# Patient Record
(demographics unavailable — no encounter records)

---

## 2017-03-10 NOTE — ER DOCUMENT REPORT
ED Respiratory Problem





- General


Chief Complaint: Breathing Difficulty


Stated Complaint: DIFFICULTY BREATHING


Time seen by provider: 22:33


Mode of Arrival: Ambulatory


Information source: Patient


TRAVEL OUTSIDE OF THE [place] IN LAST 30 DAYS: No





- HPI


Patient complains to provider of: Asthma, Cough, Short of breath


Onset: Other - 3 days


Duration: Worse/persistent


Context: Hx asthma


Short of Breath: Moderate


Chest pain/discomfort: Tightness


Cough: Productive


Sputum amount: Small


Sputum color: Yellow


Sputum consistency: Mucoid


At home treatment: Bronchodilators


Associated symptoms: Congestion, Cough, Short of breath, Wheezing


Similar symptoms previously: Yes


Recently seen / treated by doctor: No


Notes: 


Patient is a 41-year-old female with a history of asthma who presents to the 

emergency room complaining of difficulty breathing that's been worsening over 

the past 3 days, she recently completed a course of steroids approximate 5 days 

ago, she reports increased pollen contact over the last few days, a cough 

productive of thick yellow greenish phlegm, denies fever, has been using her 

albuterol inhaler and nebulizer treatments at home with minimal intermittent 

relief, denies a fever, no chest pain





- Related Data


Allergies/Adverse Reactions: 


 





Sulfa (Sulfonamide Antibiotics) Allergy (Verified 01/09/16 06:23)


 











Past Medical History





- General


Information source: Patient





- Social History


Smoking Status: Never Smoker


Chew tobacco use (# tins/day): No


Frequency of alcohol use: None


Drug Abuse: None


Family History: Reviewed & Not Pertinent


Patient has suicidal ideation: No


Patient has homicidal ideation: No





- Past Medical History


Cardiac Medical History: 


   Denies: Hx Coronary Artery Disease, Hx Heart Attack, Hx Hypertension


Pulmonary Medical History: Reports: Hx Asthma - Hospitalized Marrch & April 2015

, Hx Pneumonia - 3-4 yrs ago


   Denies: Hx Bronchitis, Hx COPD, Hx Tuberculosis


Neurological Medical History: Denies: Hx Cerebrovascular Accident, Hx Seizures


Renal/ Medical History: Denies: Hx Peritoneal Dialysis


Musculoskeltal Medical History: Denies Hx Arthritis


Past Surgical History: Reports: Hx Breast Surgery - left breast lump removed, 

Hx Hysterectomy - partial, Hx Orthopedic Surgery - RIGHT ANKLE ORIF, Hx Tubal 

Ligation.  Denies: Hx Pacemaker





- Immunizations


Hx Diphtheria, Pertussis, Tetanus Vaccination: Yes


Hx Pneumococcal Vaccination: 03/30/15





Review of Systems





- Review of Systems


Constitutional: No symptoms reported


EENT: No symptoms reported


Cardiovascular: No symptoms reported


Respiratory: See HPI


Gastrointestinal: No symptoms reported


Genitourinary: No symptoms reported


Female Genitourinary: No symptoms reported


Musculoskeletal: No symptoms reported


Skin: No symptoms reported


Hematologic/Lymphatic: No symptoms reported


Neurological/Psychological: No symptoms reported


-: Yes All other systems reviewed and negative





Physical Exam





- Vital signs


Vitals: 


 











Temp Pulse Resp BP Pulse Ox


 


 98.3 F   131 H  28 H  142/88 H  93 


 


 03/10/17 19:53  03/10/17 19:53  03/10/17 19:53  03/10/17 19:53  03/10/17 19:53











Interpretation: Hypertensive, Tachycardic, Tachypneic





- General


General appearance: Appears well, Alert





- HEENT


Head: Normocephalic, Atraumatic


Eyes: Normal


Pupils: PERRL





- Respiratory


Respiratory status: No respiratory distress


Chest status: Nontender


Breath sounds: Decreased air movement, Nonproductive cough, Wheezing


Chest palpation: Normal





- Cardiovascular


Rhythm: Regular, Tachycardia


Heart sounds: Normal auscultation


Murmur: No





- Abdominal


Inspection: Normal


Distension: No distension


Bowel sounds: Normal


Tenderness: Nontender


Organomegaly: No organomegaly





- Back


Back: Normal, Nontender





- Extremities


General upper extremity: Normal inspection, Nontender, Normal color, Normal ROM

, Normal temperature


General lower extremity: Normal inspection, Nontender, Normal color, Normal ROM

, Normal temperature, Normal weight bearing.  No: Dante's sign





- Neurological


Neuro grossly intact: Yes


Cognition: Normal


Orientation: AAOx4


Barry Coma Scale Eye Opening: Spontaneous


Jean Pierre Coma Scale Verbal: Oriented


Jean Pierre Coma Scale Motor: Obeys Commands


Barry Coma Scale Total: 15


Speech: Normal


Motor strength normal: LUE, RUE, LLE, RLE


Sensory: Normal





- Psychological


Associated symptoms: Normal affect, Normal mood





- Skin


Skin Temperature: Warm


Skin Moisture: Dry


Skin Color: Normal





Course





- Re-evaluation


Re-evalutation: 


03/11/17 01:43


Patient reports feeling much better, and ready to go home, she still has mild 

wheezing on exam, but is able to talk in full sentences, and vital signs are 

stable, she will be discharged with a tapering dose pack of steroids, and 

instructions for follow-up, advised to return if symptoms worsen, patient 

acknowledges understanding and agreement with this plan








- Vital Signs


Vital signs: 


 











Temp Pulse Resp BP Pulse Ox


 


 98.3 F   131 H  28 H  142/88 H  93 


 


 03/10/17 19:53  03/10/17 19:53  03/10/17 19:53  03/10/17 19:53  03/10/17 19:53














- Diagnostic Test


Radiology reviewed: Image reviewed, Reports reviewed





Discharge





- Discharge


Clinical Impression: 


Acute asthma exacerbation


Qualifiers:


 Asthma severity: moderate persistent Qualified Code(s): J45.41 - Moderate 

persistent asthma with (acute) exacerbation





Condition: Stable


Disposition: HOME, SELF-CARE


Instructions:  Asthma ([facility])


Additional Instructions: 


Follow up with your primary care provider in one to 2 days.  Return to the 

emergency room immediately if symptoms worsen or any additional concerns.


Prescriptions: 


Prednisone 40 mg PO DAILY #25 tablet


Referrals: 


OFE ACEVEDO MD [Primary Care Provider] - Follow up as needed

## 2017-03-10 NOTE — ER DOCUMENT REPORT
ED Medical Screen (RME)





- General


Stated Complaint: DIFFICULTY BREATHING


Notes: 


She states for the last 3 days she has been having difficulty breathing.  Using 

her nebulizer at home with no relief.  Sees a pulmonologist in [place].  

States problems with asthma began about 6 years ago.  Has been hospitalized for 

this before.  Denies fever, congestion.  Denies chest pain but states chest 

feels tight.





I have greeted and performed a rapid initial assessment of this patient.  A 

comprehensive ED assessment and evaluation of the patient, analysis of test 

results and completion of the medical decision making process will be conducted 

by additional ED providers.


TRAVEL OUTSIDE OF THE [place] IN LAST 30 DAYS: No





- Related Data


Allergies/Adverse Reactions: 


 





Sulfa (Sulfonamide Antibiotics) Allergy (Verified 01/09/16 06:23)


 











Past Medical History





- Past Medical History


Cardiac Medical History: 


   Denies: Hx Coronary Artery Disease, Hx Heart Attack, Hx Hypertension


Pulmonary Medical History: Reports: Hx Asthma - Hospitalized Marr[facility] & April 2015

, Hx Pneumonia - 3-4 yrs ago


   Denies: Hx Bronchitis, Hx COPD, Hx Tuberculosis


Neurological Medical History: Denies: Hx Cerebrovascular Accident, Hx Seizures


Musculoskeltal Medical History: Denies Hx Arthritis


Past Surgical History: Reports: Hx Breast Surgery - left breast lump removed, 

Hx Hysterectomy - partial, Hx Orthopedic Surgery - RIGHT ANKLE ORIF, Hx Tubal 

Ligation.  Denies: Hx Pacemaker





- Immunizations


Hx Diphtheria, Pertussis, Tetanus Vaccination: Yes





Physical Exam





- Respiratory


Notes: 


Patient has audible wheezing in triage.  Speaks in full sentences, but noted to 

be taking short, shallow breaths.

## 2017-04-09 NOTE — ER DOCUMENT REPORT
ED General





- General


Stated Complaint: RESPIRATORY DISTRESS


Notes: 


A she is a 41-year-old female past medical history of asthma that has required 

multiple admissions in the past but no prior intubations who presents in 

respiratory distress.  Patient states that she began having progressive 

worsening of her wheezing and coughing today.  She was using home nebulizer 

treatments with minimal to no improvement of her symptoms.  This did prompt her 

to contact EMS.  States she's had similar exacerbations in the past that 

required hospitalization.  She was recently treated with a prednisone taper 

that she completed 2 weeks ago and states she had been doing well up until last 

2 days.  She denies any constitutional symptoms or fever.  No chest pain or 

pleuritic pain.  Nothing worsens or symptoms.  She has not seen her primary 

care doctor since her symptoms started getting worse.


TRAVEL OUTSIDE OF THE [place] IN LAST 30 DAYS: No





- Related Data


Allergies/Adverse Reactions: 


 





Sulfa (Sulfonamide Antibiotics) Allergy (Verified 01/09/16 06:23)


 











Past Medical History





- General


Information source: Patient





- Social History


Smoking Status: Never Smoker


Frequency of alcohol use: None


Drug Abuse: None


Lives with: Family


Family History: Reviewed & Not Pertinent





- Past Medical History


Cardiac Medical History: 


   Denies: Hx Coronary Artery Disease, Hx Heart Attack, Hx Hypertension


Pulmonary Medical History: Reports: Hx Asthma - Hospitalized [facility] & April 2015

, Hx Pneumonia - 3-4 yrs ago


   Denies: Hx Bronchitis, Hx COPD, Hx Tuberculosis


Neurological Medical History: Denies: Hx Cerebrovascular Accident, Hx Seizures


Renal/ Medical History: Denies: Hx Peritoneal Dialysis


Musculoskeltal Medical History: Denies Hx Arthritis


Past Surgical History: Reports: Hx Breast Surgery - left breast lump removed, 

Hx Hysterectomy - partial, Hx Orthopedic Surgery - RIGHT ANKLE ORIF, Hx Tubal 

Ligation.  Denies: Hx Pacemaker





- Immunizations


Hx Diphtheria, Pertussis, Tetanus Vaccination: Yes


Hx Pneumococcal Vaccination: 03/30/15





Review of Systems





- Review of Systems


Notes: 


Constitutional: Negative for fever.


HENT: Negative for sore throat.


Eyes: Negative for visual changes.


Cardiovascular: Negative for chest pain.


Respiratory: Positive for shortness of breath.


Gastrointestinal: Negative for abdominal pain, vomiting or diarrhea.


Genitourinary: Negative for dysuria.


Musculoskeletal: Negative for back pain.


Skin: Negative for rash.


Neurological: Negative for headaches, weakness or numbness.





10 point ROS negative except as marked above and in HPI.





Physical Exam





- Vital signs


Vitals: 


 











Temp Pulse Resp BP Pulse Ox


 


 98.8 F   120 H  28 H  134/80 H  95 


 


 04/09/17 20:10  04/09/17 20:10  04/09/17 20:10  04/09/17 20:10  04/09/17 20:10











Interpretation: Tachycardic, Tachypneic


Notes: 


PHYSICAL EXAMINATION:





GENERAL: Patient appears in moderate respiratory distress, uncomfortable in 

appearance.





HEAD: Atraumatic, normocephalic.





EYES: Pupils equal round and reactive to light, extraocular movements intact, 

sclera anicteric, conjunctiva are normal.





ENT: nares patent, oropharynx clear without exudates.  Moderately dry mucous 

membranes.





NECK: Normal range of motion, supple without lymphadenopathy





LUNGS: Tachypnea with initial respiratory rate of 34 at time of assessment.  

Diminished air movement in all lung fields.  Severe wheezing with prolonged 

expiratory phase in all lung fields.





HEART: Tachycardia without murmurs





ABDOMEN: Soft, nontender, normoactive bowel sounds.  No guarding, no rebound.  

No masses appreciated.





EXTREMITIES: Normal range of motion, no pitting or edema.  No cyanosis.





NEUROLOGICAL: No focal neurological deficits. Moves all extremities 

spontaneously and on command.





PSYCH: Normal mood, normal affect.





SKIN: Warm, Dry, normal turgor, no rashes or lesions noted.





Course





- Re-evaluation


Re-evalutation: 


04/09/17 20:17


Patient presents in moderate respiratory distress, with super clavicular costal 

retractions.  She is tachypneic.  She has already received 125 mg of Solu-

Medrol prior to arrival.  She will be started on 2 g of IV magnesium with rapid 

infusion over 20 minutes.  IV fluids will be initiated.  She will be started on 

continuous albuterol nebulizers at this time.  Cardiac monitor will be placed.  

She will require frequent reassessments given her distress.





2045-patient continues with moderate respiratory distress although improved 

relative to arrival.  She remains slightly tachypneic with wheezing in all lung 

fields.  Will continue to monitor





04/09/17 21:57


On reassessment, patient is dramatically improved no longer having any 

respiratory distress.  She has only a scant wheeze in the bilateral lung fields 

on expiratory phase at this time.  Once she completes her continuous nebulizer, 

will plan for monitoring for at least one hour off continuous nebulizer prior 

to consideration of discharge





04/09/17 23:22


Patient's work of breathing is now normalized although her oxygen saturation is 

at 92% on room air.  I have discussed at length with the patient that given her 

oxygen saturation dips as low as 89% despite our interventions she should be 

admitted to the hospital as she is very high risk for decompensation if she is 

to be discharged.  Patient states that she's been admitted so many times in the 

past and so tired of being in hospitals that she really does not want to be 

admitted.  She verbalizes an understanding that if she goes home, she could get 

significantly sicker and even die.  Patient does have capacity to make this 

decision.  She has sound reasoning and logic.  She is not intoxicated.  

Multiple her family members at the bedside are witnesses conversation including 

her daughters.  She states that she plans to follow-up in her pulmonologists 

office immediately in the morning and will immediately return to the emergency 

department if she begins to get sicker.





- Vital Signs


Vital signs: 


 











Temp Pulse Resp BP Pulse Ox


 


 98.8 F   120 H  28 H  105/83   90 L


 


 04/09/17 20:10  04/09/17 20:10  04/09/17 20:10  04/09/17 23:01  04/09/17 23:01














- Diagnostic Test


Radiology reviewed: Image reviewed, Reports reviewed


Radiology results interpreted by me: 





04/10/17 03:10


Chest x-ray: No acute infiltrate





Critical Care Note





- Critical Care Note


Total time excluding time spent on procedures (mins): 40


Comments: 


Critical care time spent obtaining history from patient or surrogate, 

discussions with consultants, development of treatment plan with patient or 

surrogate, evaluation of patient's response to treatment, examination of patient

, ordering and performing treatments and interventions, ordering and review of 

laboratory studies, re-evaluation of patient's condition, ordering and review 

of radiographic studies and review of old charts





Discharge





- Discharge


Clinical Impression: 


 Asthma exacerbation, Respiratory distress





Condition: Serious


Disposition: HOME, SELF-CARE


Additional Instructions: 


You were seen for an asthma exacerbation.  We recommended you be admitted to 

the hospital but you have elected to go home and follow-up with your 

pulmonologist in the morning.  It is very important that you return to the 

emergency department immediately if you began to have worsening difficulty 

breathing that does not respond to your normal home nebulizers.  You are also 

being sent home on a five-day course of steroids that you should start taking 

tomorrow.  Please also follow closely with your primary care physician.  you 

should also return to emergency department if you develop fever greater than 101

, persistent cough, persistent vomiting, pass out, or any other symptoms that 

are concerning to you.


Prescriptions: 


Prednisone [Deltasone 20 mg Tablet] 3 tab PO DAILY 5 Days


Referrals: 


OFE ACEVEDO MD [Primary Care Provider] - Follow up as needed

## 2017-08-05 NOTE — RADIOLOGY REPORT (SQ)
EXAM DESCRIPTION:  C SP 4 OR 5 VIEWS



COMPLETED DATE/TIME:  8/5/2017 11:25 am



REASON FOR STUDY:  CERVICALGIA



COMPARISON:  None.



NUMBER OF VIEWS:  Five views including obliques.



TECHNIQUE:  AP, lateral, obliques and odontoid radiographic images acquired of the cervical spine.



LIMITATIONS:  None.



FINDINGS:  MINERALIZATION: Normal.

SEGMENTATION: Normal.

ALIGNMENT: Normal.

VERTEBRAE: Maintained height.  No fracture or worrisome bone lesion.

DISCS: Multilevel disc space narrowing with osteophytes.

POSTERIOR ELEMENTS: Pedicles and facets are intact.  No posterior arch defects.

Facet arthropathy is present.

FORAMINA: Narrowed at the levels of maximal disc and facet disease.

HARDWARE: None in the spine.

PARASPINAL SOFT TISSUES: Normal.

OTHER: No other significant finding.



IMPRESSION:  SPONDYLOSIS WITHOUT BONE LESION OR FRACTURE.



TECHNICAL DOCUMENTATION:  JOB ID:  [id number]

 2011 [employer]- All Rights Reserved

## 2018-02-14 NOTE — ER DOCUMENT REPORT
HPI





- HPI


Pain Level: Denies


Context: 





Patient is a 42-year-old female presents emergency department with a chief 

complaint of feeling tired, nonproductive cough, nausea over the past couple of 

days.  Patient states that she was sent here by her employer to be checked out.

  Patient does admit to a history of asthma that she follows with Dr. Guthrie 

for and is on chronic prednisone.  She states she occasionally coughs up some 

sputum but denies any tan colored sputum, fever, dyspnea on exertion or 

shortness of breath.  She denies any fevers or chills.  Previous hysterectomy





- REPRODUCTIVE


LMP: hyst


Reproductive: DENIES: Pregnant:





Past Medical History





- Social History


Smoking Status: Never Smoker


Chew tobacco use (# tins/day): No


Frequency of alcohol use: None


Drug Abuse: None


Family History: Reviewed & Not Pertinent


Patient has suicidal ideation: No


Patient has homicidal ideation: No





- Past Medical History


Cardiac Medical History: 


   Denies: Hx Coronary Artery Disease, Hx Heart Attack, Hx Hypertension


Pulmonary Medical History: Reports: Hx Asthma - Hospitalized Marr[facility] & April 2015

, Hx Pneumonia - 3-4 yrs ago


   Denies: Hx Bronchitis, Hx COPD, Hx Tuberculosis


Neurological Medical History: Denies: Hx Cerebrovascular Accident, Hx Seizures


Renal/ Medical History: Denies: Hx Peritoneal Dialysis


Musculoskeltal Medical History: Denies Hx Arthritis


Past Surgical History: Reports: Hx Breast Surgery - left breast lump removed, 

Hx Hysterectomy, Hx Orthopedic Surgery - RIGHT ANKLE ORIF, Hx Tubal Ligation.  

Denies: Hx Pacemaker





- Immunizations


Hx Diphtheria, Pertussis, Tetanus Vaccination: Yes


Hx Pneumococcal Vaccination: 03/30/15





[employer] Provider Document





- CONSTITUTIONAL


Agree With Documented VS: Yes


Notes: 





PHYSICAL EXAM


GENERAL: Alert, interacts well. 


HEAD: Normocephalic, atraumatic.


EYES: Pupils equal, round, and reactive to light. Extraocular movements intact.


ENT: Oral mucosa moist, tongue midline. 


NECK: Full range of motion. Supple. Trachea midline.


LUNGS: MIld wheezes bilaterally worse in MARTIN, rales, or rhonchi. No respiratory 

distress.


HEART: Regular rate and rhythm. No murmurs, gallops, or rubs.


ABDOMEN: Soft,  nondistended, nontender. No guarding, rebound, or rigidity.. 

Bowel sounds present in all 4 quadrants.


EXTREMITIES: Moves all 4 extremities spontaneously. No edema, radial and 

dorsalis pedis pulses 2/4 bilaterally. No cyanosis. 


NEUROLOGICAL: Alert and oriented x4. Normal speech.


PSYCH: Normal affect, normal mood.


SKIN: Warm, dry, normal turgor. No rashes or lesions noted.








- INFECTION CONTROL


TRAVEL OUTSIDE OF THE [place] IN LAST 30 DAYS: No





- RESPIRATORY


O2 Sat by Pulse Oximetry: 95





Course





- Re-evaluation


Re-evalutation: 





02/14/18 16:46


Patient is a 42-year-old female presents emergency department the chief 

complaint of nausea, nonproductive cough.  Patient's vitals are stable and able 

to ambulate and maintain the saturation 9690% on room air.  Patient states she 

clinically feels better after breathing treatment.  Chest x-ray does not show 

any evidence of an infiltrate.  Will discharge patient home with instruction to 

follow-up with her pulmonologist Dr. Guthrie tomorrow otherwise will discharge 

home with rescue inhaler and Zofran.  Discussed strict return precautions 

otherwise patient is stable for discharge home.





- Vital Signs


Vital signs: 


 











Temp Pulse Resp BP Pulse Ox


 


 98.6 F   108 H  18   131/82 H  95 


 


 02/14/18 14:21  02/14/18 14:21  02/14/18 14:21  02/14/18 14:21  02/14/18 14:21














- Diagnostic Test


Radiology reviewed: Image reviewed, Reports reviewed





Discharge





- Discharge


Clinical Impression: 


 Nausea





Asthma exacerbation


Qualifiers:


 Asthma severity: unspecified severity Asthma persistence: unspecified 

Qualified Code(s): J45.901 - Unspecified asthma with (acute) exacerbation





Condition: Good


Disposition: HOME, SELF-CARE


Instructions:  Asthma ([facility])


Prescriptions: 


Albuterol Sulfate [Proair HFA] 1 - 2 puff IH Q4 PRN #1 inhaler


 PRN Reason: 


Ondansetron [Zofran Odt 4 mg Tablet] 1 - 2 tab PO Q4H PRN #15 tab.rapdis


 PRN Reason: For Nausea/Vomiting


Forms:  Return to Work


Referrals: 


GADIEL GUTHRIE MD [ACTIVE STAFF] - Follow up tomorrow

## 2018-02-14 NOTE — RADIOLOGY REPORT (SQ)
Exam Description

CHEST PA/LAT



Completed Date/time

02/14/2018  4:03 PM



Reason For Study

h/o asthma, sweats and prod cough



Comparison

3/10/2017.



Exam Parameters

NUMBER OF VIEWS: Two views.

TECHNIQUE: Digital Frontal and Lateral radiographic views of the chest acquired.

Limitations: (None).



Findings

LUNGS AND PLEURA: No opacities, masses or pneumothorax No pleural effusion.

MEDIASTINUM AND HILAR STRUCTURES: No masses or contour abnormalities.

HEART AND VASCULAR STRUCTURES: Heart size is normal. No evidence for failure.

(Normal appearing aorta for age.) .

BONES: No acute findings.

HARDWARE: None.



Impression

NO SIGNIFICANT RADIOGRAPHIC FINDING IN THE CHEST.







Technical Documentation

2011 [employer]- All Rights Reserved

## 2018-02-22 NOTE — ER DOCUMENT REPORT
ED Respiratory Problem





- General


Mode of Arrival: Ambulatory


Information source: Patient


TRAVEL OUTSIDE OF THE [place] IN LAST 30 DAYS: No





- HPI


Patient complains to provider of: Asthma, Cough, Short of breath


Onset: This morning


Context: Hx asthma.  denies: Smoker


Cough: Productive


At home treatment: Bronchodilators - Albuterol


Associated symptoms: Other - see notes above





- General


Chief Complaint: Wheezing >1yr age


Stated Complaint: BREATHING PROBLEMS


Time Seen by Provider: 02/22/18 09:11


Notes: 





42 year old female with history of asthma (daily Prednisone x4 years; recently 

started tapering medication now on one 5mg daily) presents to the ED 

complaining of shortness of breath and wheezing that started this morning. 

Patient took an Albuterol treatment at 0600 with minimal relief. Patient called 

her pulmonologist, Dr. Guthrie, but was unable to get an appointment today. 

When the patient continued to wheeze she decided to come to the ED. Patient 

additionally complains of a productive cough. Patient has not been intubated 

secondary to her asthma exacerbations.  (DIONICIO ACEVEDO)





- Related Data


Allergies/Adverse Reactions: 


 





Sulfa (Sulfonamide Antibiotics) Allergy (Verified 02/22/18 08:48)


 











Past Medical History





- General


Information source: Patient





- Social History


Smoking Status: Never Smoker


Chew tobacco use (# tins/day): No


Frequency of alcohol use: None


Drug Abuse: None


Family History: Reviewed & Not Pertinent





- Past Medical History


Cardiac Medical History: 


   Denies: Hx Coronary Artery Disease, Hx Heart Attack, Hx Hypertension


Pulmonary Medical History: Reports: Hx Asthma - Hospitalized March & April 2015

, Hx Pneumonia - 3-4 yrs ago


   Denies: Hx Bronchitis, Hx COPD, Hx Tuberculosis


Neurological Medical History: Denies: Hx Cerebrovascular Accident, Hx Seizures


Renal/ Medical History: Denies: Hx Peritoneal Dialysis


Musculoskeltal Medical History: Denies Hx Arthritis


Past Surgical History: Reports: Hx Breast Surgery - left breast lump removed, 

Hx Hysterectomy, Hx Orthopedic Surgery - RIGHT ANKLE ORIF, Hx Tubal Ligation.  

Denies: Hx Pacemaker





- Immunizations


Hx Diphtheria, Pertussis, Tetanus Vaccination: Yes


Hx Pneumococcal Vaccination: 03/30/15





Review of Systems





- Review of Systems


Constitutional: No symptoms reported


EENT: No symptoms reported


Cardiovascular: No symptoms reported


Respiratory: See HPI, Cough, Short of breath, Sputum, Wheezing


Gastrointestinal: No symptoms reported


Genitourinary: No symptoms reported


Female Genitourinary: No symptoms reported


Musculoskeletal: No symptoms reported


Skin: No symptoms reported


Hematologic/Lymphatic: No symptoms reported


Neurological/Psychological: No symptoms reported


-: Yes All other systems reviewed and negative





Physical Exam





- Vital signs


Vitals: 


 











Temp Pulse Resp BP Pulse Ox


 


 98.0 F   125 H  20   141/82 H  94 


 


 02/22/18 08:49  02/22/18 08:49  02/22/18 08:49  02/22/18 08:49  02/22/18 08:49














- Notes


Notes: 





GENERAL: Alert, interacts well. No acute distress.


HEAD: Normocephalic, atraumatic.


EYES: Pupils equal, round, and reactive to light. Extraocular movements intact.


ENT: Oral mucosa moist, tongue midline. 


NECK: Full range of motion. Supple. Trachea midline.


LUNGS: Diffuse expiratory wheezing. Good air movement. No rales, or rhonchi. No 

respiratory distress. No tachypnea or accessory muscle use.


HEART: Regular rate and rhythm. No murmurs, gallops, or rubs.


ABDOMEN: Soft, non-tender. Non-distended. Bowel sounds present in all 4 

quadrants.


EXTREMITIES: Moves all 4 extremities spontaneously. No edema, radial pulses 2/4 

bilaterally. No cyanosis.


NEUROLOGICAL: Alert and oriented x3. Normal speech. 


PSYCH: Normal affect, normal mood.


SKIN: Warm, dry, normal turgor. No rashes or lesions noted. (DIONICIO ACEVEDO)





Course





- Re-evaluation


Re-evalutation: 





02/22/18 10:48


Initially patient has a moderate amount of wheezing although she is in no 

obvious respiratory distress, no significant hypoxia.  Patient has recently 

been tapered from 10 mg a day of prednisone to 5 mg a day, patient will be 

increased to 7.5 mg and trialed on multiple breathing treatments here.  After 3 

back-to-back breathing treatments patient has some decrease in her wheezing and 

feels better however she still has significant wheezing.  One more 5 mg 

albuterol breathing treatment will be attempted.


02/22/18 12:24


Wheezing is improved although not completely resolved with a fourth breathing 

treatment.  Patient is feeling much better.  Patient is agreeable to going home 

at this time, we will slightly increase her prednisone to 7.5 mg per day.  

Patient will be discharged to home, will take albuterol breathing treatments 

every 4 hours for the next 2 days, will return if she worsens or does not 

improve.


02/22/18 12:29





02/22/18 12:37


Discharge vitals revealed hypoxia at 90-91%.  Obviously with this oxygen 

saturation the patient cannot be discharged home, patient will be trialed on 

one more breathing treatment and if hypoxia does not resolve patient will be 

admitted instead.


02/22/18 14:16


After repeat breathing treatment patient's oxygen saturations are consistently 

approximately 97% on room air, he was ambulated and did not desaturate below 94%

.  Patient is in no acute distress, will be discharged home at this time. (

JUS GOTTLIEB)





- Vital Signs


Vital signs: 


 











Temp Pulse Resp BP Pulse Ox


 


 98.7 F   112 H  18   113/69   95 


 


 02/22/18 14:05  02/22/18 14:05  02/22/18 14:05  02/22/18 14:05  02/22/18 14:05














Discharge





- Discharge


Clinical Impression: 


Acute asthma exacerbation


Qualifiers:


 Asthma severity: moderate Asthma persistence: persistent Qualified Code(s): 

J45.41 - Moderate persistent asthma with (acute) exacerbation





Hypertension


Qualifiers:


 Hypertension type: essential hypertension Qualified Code(s): I10 - Essential (

primary) hypertension





Condition: Stable


Disposition: HOME, SELF-CARE


Additional Instructions: 


Please take your albuterol breathing treatments 1 treatment every 4 hours while 

awake for the next 2 days.  You may then decrease as needed back to regular 

schedule.





Please increase your prednisone to 7.5 mg once a day for the next week then 

returned to 5 mg a day and return to regular weaning schedule.


Prescriptions: 


Albuterol Sulfate [Albuterol Sulfate 2.5mg/3 mL] 2.5 mg IH Q4 #30 unit


Prednisone 2.5 mg PO DAILY #7 tablet


Forms:  Elevated Blood Pressure, Return to Work


Referrals: 


OFE ACEVEDO MD [Primary Care Provider] - Follow up in 1 week


Scribe Attestation: 





02/22/18 14:50


I personally performed the services described in the documentation, reviewed 

and edited the documentation which was dictated to the scribe in my presence, 

and it accurately records my words and actions. (JUS GOTTLIEB)





Scribe Documentation





- Scribe


Written by Tristianibe:: Jonatan Oconnell, 02/22/2018 0947


acting as scribe for :: Bebo

## 2018-03-04 NOTE — RADIOLOGY REPORT (SQ)
EXAM DESCRIPTION:  CHEST PA/LAT



COMPLETED DATE/TIME:  3/4/2018 2:41 pm



REASON FOR STUDY:  Asthma, cough with thick sputum



COMPARISON:  2/14/2018.



EXAM PARAMETERS:  NUMBER OF VIEWS: two views

TECHNIQUE: Digital Frontal and Lateral radiographic views of the chest acquired.

RADIATION DOSE: NA

LIMITATIONS: none



FINDINGS:  LUNGS AND PLEURA: No opacities, masses or pneumothorax. No pleural effusion.

MEDIASTINUM AND HILAR STRUCTURES: No masses or contour abnormalities.

HEART AND VASCULAR STRUCTURES: Heart normal size.  No evidence for failure.

BONES: No acute findings.

HARDWARE: None in the chest.

OTHER: No other significant finding.



IMPRESSION:  NO SIGNIFICANT RADIOGRAPHIC FINDING IN THE CHEST.



TECHNICAL DOCUMENTATION:  JOB ID:  [id number]

 2011 [employer]- All Rights Reserved



Reading location - IP/workstation name: [email or web address]

## 2018-03-04 NOTE — ER DOCUMENT REPORT
ED Medical Screen (RME)





- General


Chief Complaint: Shortness Of Breath


Stated Complaint: WHEEZING


Time Seen by Provider: 03/04/18 13:55


Notes: 





Patient is here because she is having difficulty breathing and wheezing.  She 

has a history of asthma for which she has home nebulizer and has been using 

albuterol every 4 hours and Pulmicort twice a day.  She was on prednisone, but 

only taking 2.5 mg pills and ran out of them.  Has a cough productive of thick 

yellow sputum.





TRAVEL OUTSIDE OF THE [place] IN LAST 30 DAYS: No





- Related Data


Allergies/Adverse Reactions: 


 





Sulfa (Sulfonamide Antibiotics) Allergy (Verified 02/22/18 08:48)


 











Past Medical History





- Social History


Cigarette use (# per day): No


Chew tobacco use (# tins/day): No


Family history: Reviewed & Not Pertinent


Pulmonary Medical History: Reports: Hx Asthma - Hospitalized March & April 2015

, Hx Pneumonia - 3-4 yrs ago


Past Surgical History: Reports: Hx Breast Surgery - left breast lump removed, 

Hx Genitourinary Surgery - Bladder tuck, Hx Hysterectomy, Hx Orthopedic Surgery 

- RIGHT ANKLE ORIF, Hx Tubal Ligation.  Denies: Hx Pacemaker





- Immunizations


Hx Diphtheria, Pertussis, Tetanus Vaccination: Yes





Review of Systems





- Review of Systems


Notes: 





REVIEW OF SYSTEMS:





CONSTITUTIONAL :  Denies fever.


  


EENT:   Denies eye, ear, nose or mouth or throat pain or other symptoms.





CARDIOVASCULAR:  Denies chest pain.





RESPIRATORY: See HPI.  





GASTROINTESTINAL:  Denies abdominal pain or nausea, vomiting, or diarrhea.





GENITOURINARY:  Denies difficulty or painful urinating, urinary frequency, 

blood in urine.





MUSCULOSKELETAL:  Denies back or neck pain.  Denies joint pain or swelling.





SKIN:   Denies rash or skin lesions.





NEUROLOGICAL:  Denies LOC or altered mental status.  Denies headache.  Denies 

sensory loss or motor deficits.





ALL OTHER SYSTEMS REVIEWED AND NEGATIVE.





Physical Exam





- Vital signs


Vitals: 


 











Temp Pulse BP Pulse Ox


 


 98.5 F   122 H  122/74   92 


 


 03/04/18 13:47  03/04/18 13:47  03/04/18 13:47  03/04/18 13:47











Interpretation: Tachycardic - Slightly at 122


Notes: 





PHYSICAL EXAMINATION:





GENERAL: Well-appearing, in no acute distress.  Slightly tachycardic vital 

signs.





HEAD: Atraumatic, normocephalic.





EYES: Pupils equal round and reactive to light, extraocular movements intact.





ENT: oropharynx clear without exudates.  Moist mucous membranes.





NECK: Normal range of motion, supple.





LUNGS: Diffuse expiratory wheezes bilaterally.  Moderate airflow.





HEART: Regular rate and rhythm without murmurs.  At patient's side, I took 

vital signs and heart rate is 100.





ABDOMEN: Soft, nontender.  No guarding or rebound.  No masses.





BACK:  No tenderness throughout entire back.





EXTREMITIES: Normal range of motion without pain.





NEUROLOGICAL:  Normal speech, normal gait.  Normal sensory, motor, and reflex 

exams.  Awake, alert, and oriented x3.  Cranial nerves normal.





PSYCH: Normal mood, normal affect.





SKIN: Warm, dry, no rashes.





Course





- Re-evaluation


Re-evalutation: 





03/04/18 16:29


Patient received 3 nebulizer treatments, the first of the DuoNeb and the second 

and third of albuterol.  She was given 80 mg of prednisone p.o. at the 

beginning of her treatment in the emergency department.  Obtained a chest x-ray 

that shows no acute findings.  Patient gradually improved with much improved 

airflow at the end of her stay.  She still had diffuse expiratory wheezes, but 

nowhere near as tight as when she arrived and was initially examined.  Her 

oxygen saturation is between 93 and 95% on room air.  Heart rate is still 100 

by me at discharge.  Patient's being provided with a prescription for a 

declining dose of prednisone starting at 60 mg tomorrow and 10 mg less each day 

thereafter until finished.  She is to continue her current home treatments of 

the nebulizer medications.











- Vital Signs


Vital signs: 


 











Temp Pulse Resp BP Pulse Ox


 


 98.7 F   108 H  16   133/87 H  94 


 


 03/04/18 16:18  03/04/18 16:18  03/04/18 16:18  03/04/18 16:18  03/04/18 16:18














- Diagnostic Test


Radiology results interpreted by me: 





03/04/18 16:29


Chest x-ray is normal.





Doctor's Discharge





- Discharge


Clinical Impression: 


 Asthma exacerbation





Condition: Stable


Disposition: HOME, SELF-CARE


Additional Instructions: 


ASTHMA:





     You have been diagnosed as having asthma.  This is a condition where there 

is episodic tightness in the bronchial tubes.  Allergies, infections, and 

polluted or cold air may be contributing factors.


     Emergency treatment of a severe asthma attack may include adrenaline shots

, or bronchodilator aerosol.  You may feel lightheaded, have a decreased 

exercise tolerance and a rapid pulse for an hour or two.  Rest and get plenty 

of fluids.


     Home treatment of asthma requires bronchodilator drugs.  These can be 

administered by injection, inhalation, or by mouth.  Antibiotics and 

corticosteroids may be required for some patients.  You should avoid chemical 

fumes, dusts, pollens, and exercising in very cold or dry air. If you smoke, 

stop!!


     If you develop a fever, increased wheezing, chest pain, or severe 

shortness of breath, you should contact the doctor immediately.








STEROID MEDICATION:


     You have been given an injection of or oral medicine of the cortisone/

steroid class.  This medication is used to control inflammation or allergy.  

Enio t is usually only given for a short period of time, until the acute process 

subsides.


     There are usually no side effects from short-term use of cortisone-like 

medications.  Some persons feel an increased sense of well-being and are not 

sleepy at bedtime.  Long-term use of cortisone medications is best avoided, 

unless required for a severe condition.  If your condition does not remit, or 

relapses after the course of corticosteroid medication, you should consult your 

physician.








INHALED BRONCHODILATORS:


     You have received treatment(s) of and/or prescription for an inhaled 

bronchodilator -- a medication which stimulates the airways in the lung to 

dilate.  This improves the flow of air in asthma, bronchitis, and emphysema.


     These medicines have some similarity to adrenaline, and can cause similar 

side effects:  shakiness, racing heart, and a sense of nervousness.  These side 

effects decrease with time.  Contact your doctor if these side effects are 

severe.


     Do not over-use the medicine.  Too-frequent use of the inhaler may make it 

ineffective.  Call your doctor if the inhaler is not controlling your symptoms 

at the prescribed doses.





USE OF ACETAMINOPHEN:


     Acetaminophen may be taken for pain relief or fever control. It's much 

safer than aspirin, offering a wider range of "safe" dosages.  It is safe 

during pregnancy.  Some brand names are Tylenol, Panadol, Datril, Anacin 3, 

Tempra, and Liquiprin. Acetaminophen can be repeated every four hours.  The 

following are maximum recommended dosages:








USE OF ACETAMINOPHEN (Tylenol):


     Acetaminophen may be taken for pain relief or fever control. It's much 

safer than aspirin, offering a wider range of "safe" dosages.  It is safe 

during pregnancy.  Some brand names are Tylenol, Panadol, Datril, Anacin 3, 

Tempra, and Liquiprin. Acetaminophen can be repeated every four hours.  The 

following are maximum recommended dosages:





WEIGHT         Dose             Drops                  Elixir        Chewable(

80mg)


(LBS.)                            drprs=droppers    tsp=teaspoon


>89 pounds or adults          650 mg to 900 mg





Acetaminophen can be repeated every four hours.  Maximum dose not to exceed 

4000 mg a day.





   These maximum recommended dosages are slightly higher than the dosages 

written on the product container, but these dosages are very safe and below the 

toxic dosage for acetaminophen.





Continue to use your nebulizer with albuterol every 4 hours and continue using 

your nebulizer with Pulmicort twice a day.  Start the prednisone that has been 

prescribed for you tomorrow.





FOLLOW-UP CARE:


If you have been referred to a physician for follow-up care, call the physician

s office for an appointment as you were instructed or within the next two days.

  If you experience worsening or a significant change in your symptoms, notify 

the physician immediately or return to the Emergency Department at any time for 

re-evaluation.


Prescriptions: 


Prednisone [Deltasone 10 mg Tablet] 10 mg PO ASDIR PRN #21 tablet


 PRN Reason: 


Referrals: 


OFE ACEVEDO MD [Primary Care Provider] - Follow up as needed

## 2018-03-21 NOTE — ER DOCUMENT REPORT
ED Respiratory Problem





- General


Chief Complaint: Shortness Of Breath


Stated Complaint: DIFFICULTY BREATHING


Time Seen by Provider: 03/21/18 14:36


Mode of Arrival: Ambulatory


Information source: Patient


Notes: 





43 yo female with severe persistant asthma, pt of dr guthrie flares when she is 

not on oral steroids. Uses symbicort, albuterol, antihistamin, not singulair. 

Wheezing again for a few days. No fever. No chest painl.


TRAVEL OUTSIDE OF THE [place] IN LAST 30 DAYS: No





- Related Data


Allergies/Adverse Reactions: 


 





Sulfa (Sulfonamide Antibiotics) Allergy (Verified 02/22/18 08:48)


 











Past Medical History





- General


Information source: Patient





- Social History


Smoking Status: Never Smoker


Frequency of alcohol use: Rare


Drug Abuse: None


Lives with: Family


Family History: Reviewed & Not Pertinent


Patient has suicidal ideation: No


Patient has homicidal ideation: No


Pulmonary Medical History: Reports: Hx Asthma - Hospitalized March & April 2015

, Hx Pneumonia - 3-4 yrs ago


Renal/ Medical History: Denies: Hx Peritoneal Dialysis


Past Surgical History: Reports: Hx Breast Surgery - left breast lump removed, 

Hx Genitourinary Surgery - Bladder tuck, Hx Hysterectomy, Hx Orthopedic Surgery 

- RIGHT ANKLE ORIF, Hx Tubal Ligation.  Denies: Hx Pacemaker





- Immunizations


Hx Diphtheria, Pertussis, Tetanus Vaccination: Yes


Hx Pneumococcal Vaccination: 03/30/15





Review of Systems





- Review of Systems


Constitutional: No symptoms reported


EENT: No symptoms reported


Cardiovascular: No symptoms reported


Respiratory: See HPI


Gastrointestinal: No symptoms reported


Genitourinary: No symptoms reported


Female Genitourinary: No symptoms reported


Musculoskeletal: No symptoms reported


Skin: No symptoms reported


Hematologic/Lymphatic: No symptoms reported


Neurological/Psychological: No symptoms reported





Physical Exam





- Vital signs


Vitals: 


 











Temp Pulse Resp BP Pulse Ox


 


 98.6 F   117 H  26 H  134/88 H  91 L


 


 03/21/18 14:09  03/21/18 14:09  03/21/18 14:09  03/21/18 14:09  03/21/18 14:09











Interpretation: Normal





- General


General appearance: Appears well, Alert





- HEENT


Head: Normocephalic, Atraumatic


Eyes: Normal


Conjunctiva: Normal


Pupils: PERRL


Tympanic membrane: Normal


Pharynx: Erythema - mild


Neck: Supple.  No: Lymphadenopathy





- Respiratory


Respiratory status: No respiratory distress


Chest status: Nontender


Breath sounds: Wheezing - expiratory bilaterally


Chest palpation: Normal





- Cardiovascular


Rhythm: Regular


Heart sounds: Normal auscultation


Murmur: No





- Abdominal


Inspection: Normal


Distension: No distension


Bowel sounds: Normal


Tenderness: Nontender


Organomegaly: No organomegaly





- Back


Back: Normal, Nontender





- Extremities


General upper extremity: Normal inspection, Nontender, Normal color, Normal ROM

, Normal temperature


General lower extremity: Normal inspection, Nontender, Normal color, Normal ROM

, Normal temperature, Normal weight bearing.  No: Dante's sign





- Neurological


Neuro grossly intact: Yes


Cognition: Normal


Orientation: AAOx4


[place] Coma Scale Eye Opening: Spontaneous


[place] Coma Scale Verbal: Oriented


[place] Coma Scale Motor: Obeys Commands


[place] Coma Scale Total: 15


Speech: Normal


Motor strength normal: LUE, RUE, LLE, RLE


Sensory: Normal





- Psychological


Associated symptoms: Normal affect, Normal mood





- Skin


Skin Temperature: Warm


Skin Moisture: Dry


Skin Color: Normal





Course





- Re-evaluation


Re-evalutation: 





03/21/18 19:12


Patient feels a lot better pulse ox is 94-95%, pulse is 117. RR 18


Faint expiratory wheeze bilateral but moving air much better.  IV fluid has 

infused.  She has had to grams of magnesium.  I will refill her albuterol 

Nebules and metered-dose inhaler.  I explained that she needs to return to the 

emergency room if her symptoms worsen again tonight and that she needs to see 

Dr. Guthrie this week


03/21/18 19:14





03/21/18 19:16





03/21/18 19:17





03/21/18 19:17








- Vital Signs


Vital signs: 


 











Temp Pulse Resp BP Pulse Ox


 


 99.4 F   92   24 H  123/74   93 


 


 03/21/18 20:11  03/21/18 20:11  03/21/18 20:11  03/21/18 20:11  03/21/18 20:11














- Laboratory


Result Diagrams: 


 03/21/18 17:30





 03/21/18 17:20


Laboratory results interpreted by me: 


 











  03/21/18 03/21/18





  17:20 17:30


 


WBC   16.0 H


 


RDW   14.2 H


 


Seg Neutrophils %   82.7 H


 


Lymphocytes %   8.7 L


 


Absolute Neutrophils   13.2 H


 


Absolute Eosinophils   0.7 H


 


Chloride  110 H 


 


Carbon Dioxide  21 L 


 


BUN  6 L 


 


Glucose  133 H 














Discharge





- Discharge


Clinical Impression: 


Asthma exacerbation


Qualifiers:


 Asthma severity: severe Asthma persistence: unspecified Qualified Code(s): 

J45.901 - Unspecified asthma with (acute) exacerbation





Condition: Good


Disposition: HOME, SELF-CARE


Instructions:  Asthma ([facility]), Inhaled Bronchodilators ([facility]), Steroid Medication


Additional Instructions: 


Call tomorrow and see Dr. Guthrie this week


Taper Dosepak of prednisone


Refill of albuterol Nebules and ProAir air metered-dose inhaler.  I told the 

patient to return to the emergency room if she gets worse again tonight.


Prescriptions: 


Albuterol Sulfate [Ventolin 0.083% Neb 2.5 mg/3 mL Ampul] 2.5 mg NEB Q3HP PRN #

25 vial


 PRN Reason: 


Albuterol Sulfate [Proair HFA Inhalation Aerosol 8.5 gm MDI] 2 puff IH Q3HP PRN 

#1 [id number].[email or web address]


 PRN Reason: 


Prednisone [Deltasone 10 mg Tablet] 10 mg PO ASDIR PRN #21 tablet


 PRN Reason: 


Referrals: 


OFE ACEVEDO MD [Primary Care Provider] - Follow up as needed


GADIEL GUTHRIE MD [ACTIVE STAFF] - Follow up tomorrow

## 2018-03-21 NOTE — ER DOCUMENT REPORT
ED Medical Screen (RME)





- General


Chief Complaint: Shortness Of Breath


Stated Complaint: DIFFICULTY BREATHING


Time Seen by Provider: 03/21/18 14:36


Notes: 





Patient states this is her third visit in the last month to month and half her 

wheezing.  She states whenever she is on prednisone she does fine but as soon 

as prednisone runs out her wheezing returns.  She states the inhalers at home 

were not helping her.  She states her oxygen saturations are usually around 94-

95%.  She denies any history of PEs or DVTs.  She states she has been diagnosed 

with asthma and chronic sinusitis.


TRAVEL OUTSIDE OF THE [place] IN LAST 30 DAYS: No





- Related Data


Allergies/Adverse Reactions: 


 





Sulfa (Sulfonamide Antibiotics) Allergy (Verified 02/22/18 08:48)


 











Past Medical History





- Social History


Frequency of alcohol use: Rare


Drug Abuse: None


Family history: Reviewed & Not Pertinent





- Past Medical History


Cardiac Medical History: 


   Denies: Hx Coronary Artery Disease, Hx Heart Attack, Hx Hypertension


Pulmonary Medical History: Reports: Hx Asthma - Hospitalized March & April 2015

, Hx Pneumonia - 3-4 yrs ago


   Denies: Hx Bronchitis, Hx COPD, Hx Tuberculosis


Neurological Medical History: Denies: Hx Cerebrovascular Accident, Hx Seizures


Renal/ Medical History: Denies: Hx Peritoneal Dialysis


Musculoskeltal Medical History: Denies Hx Arthritis


Past Surgical History: Reports: Hx Breast Surgery - left breast lump removed, 

Hx Genitourinary Surgery - Bladder tuck, Hx Hysterectomy, Hx Orthopedic Surgery 

- RIGHT ANKLE ORIF, Hx Tubal Ligation.  Denies: Hx Pacemaker





- Immunizations


Hx Diphtheria, Pertussis, Tetanus Vaccination: Yes





Physical Exam





- Vital signs


Vitals: 





 











Temp Pulse Resp BP Pulse Ox


 


 98.6 F   117 H  26 H  134/88 H  91 L


 


 03/21/18 14:09  03/21/18 14:09  03/21/18 14:09  03/21/18 14:09  03/21/18 14:09














Course





- Vital Signs


Vital signs: 





 











Temp Pulse Resp BP Pulse Ox


 


 98.6 F   117 H  26 H  134/88 H  91 L


 


 03/21/18 14:09  03/21/18 14:09  03/21/18 14:09  03/21/18 14:09  03/21/18 14:09

## 2018-03-21 NOTE — RADIOLOGY REPORT (SQ)
EXAM DESCRIPTION:  CHEST PA/LAT



COMPLETED DATE/TIME:  3/21/2018 4:47 pm



REASON FOR STUDY:  cough, wheeze



COMPARISON:  3/4/2018



EXAM PARAMETERS:  NUMBER OF VIEWS: two views

TECHNIQUE: Digital Frontal and Lateral radiographic views of the chest acquired.

RADIATION DOSE: NA

LIMITATIONS: none



FINDINGS:  LUNGS AND PLEURA: No opacities, masses or pneumothorax. No pleural effusion.

MEDIASTINUM AND HILAR STRUCTURES: No masses or contour abnormalities.

HEART AND VASCULAR STRUCTURES: Heart normal size.  No evidence for failure.

BONES: No acute findings.

HARDWARE: None in the chest.

OTHER: No other significant finding.



IMPRESSION:  NO SIGNIFICANT RADIOGRAPHIC FINDING IN THE CHEST.



TECHNICAL DOCUMENTATION:  JOB ID:  [id number]

 2011 [employer]- All Rights Reserved



Reading location - IP/workstation name: [email or web address]

## 2018-04-12 NOTE — WOMENS IMAGING REPORT
EXAM DESCRIPTION:  3D SCREENING MAMMO BILAT



COMPLETED DATE/TIME:  4/11/2018 4:07 pm



REASON FOR STUDY:  ROUTINE SCREENING;Z12.31 Z12.31  ENCNTR SCREEN MAMMOGRAM FOR MALIGNANT NEOPLASM OF
 ADAN



COMPARISON:  None.



TECHNIQUE:  Standard craniocaudal and mediolateral oblique views of each breast recorded using digita
l acquisition and breast tomosynthesis.



LIMITATIONS:  None.



FINDINGS:  RIGHT BREAST

MASSES: No suspicious masses.

CALCIFICATIONS: No new or suspicious calcifications.

ARCHITECTURAL DISTORTION: None.

DEVELOPING DENSITY: None.

ASYMMETRY: None noted.

OTHER: No other significant findings.

LEFT BREAST

MASSES: 1.4 cm lobular smooth mass inferior retroareolar

CALCIFICATIONS: No new or suspicious calcifications.

ARCHITECTURAL DISTORTION: None.

DEVELOPING DENSITY: None.

ASYMMETRY: None noted.

OTHER: No other significant findings.

Read with the assistance of CAD.

[facility] - R2 Cenova Version 1.3

.[facility] - R2 Cenova Version 1.3

.[facility] - R2 Cenova Version 2.4

.[facility] - R2 Cenova Version 2.4

.[facility] - R2  Version 9.2



IMPRESSION:  1.4 cm lobular smooth mass subareolar left breast.



BREAST DENSITY:  b. There are scattered areas of fibroglandular density.



BIRAD:  0 Incomplete:  Needs Additional Imaging Evaluation and/or prior Mammograms for Comparison.



RECOMMENDATION:  RECOMMENDED FOLLOW-UP: Ultrasound.

The patient will be contacted for additional imaging.



COMMENT:  The patient has been notified of the results by letter per SA requirements. Additional no
tification policies are in place for contacting patient with suspicious or incomplete findings.

Quality ID #225: The American College of Radiology recommends an annual screening mammogram for women
 aged 40 years or over. This facility utilizes a reminder system to ensure that all patients receive 
reminder letters, and/or direct phone calls for appointments. This includes reminders for routine scr
eening mammograms, diagnostic mammograms, or other Breast Imaging Interventions when appropriate.  Th
is patient will be placed in the appropriate reminder system.

The American College of Radiology (ACR) has developed recommendations for screening MRI of the breast
s in certain patient populations, to be used in conjunction with mammography.  Breast MRI surveillanc
e may be appropriate for women with more than 20% lifetime risk of developing breast cancer  as deter
mined by genetic testing, significant family history of the disease, or history of mantle radiation f
or Hodgkins Disease.  ACR Practice Guidelines 2008.

[employer]



PQRS 6045F: Fluoroscopic imaging is not utilized for breast tomosynthesis.



TECHNICAL DOCUMENTATION:  FINDING NUMBER: (1)

ASSESSMENT: (1)

JOB ID:  [id number]

 2011 [employer]- All Rights Reserved



Reading location - IP/workstation name: [email or web address]

## 2018-04-16 NOTE — WOMENS IMAGING REPORT
EXAM DESCRIPTION:  U/S BREAST UNILAT LIMITED



COMPLETED DATE/TIME:  4/16/2018 1:30 pm



REASON FOR STUDY:  UNSPECIFIED LUMP; N63.42 N63.42  UNSPECIFIED LUMP IN LEFT BREAST, SUBAREOLAR



COMPARISON:  Mammograms/breast tomosynthesis 4/11/2018



TECHNIQUE:  Real-time and static grayscale imaging performed of the left breast targeted to the area 
of mammographic concern. Selected color Doppler images recorded.



LIMITATIONS:  None.



FINDINGS:  Left breast retroareolar ultrasound was performed.  A well-circumscribed hypoechoic solid 
nodule with internal color flow is present, dumbbell-shaped.  This measures about 14 x 11 x 6 mm in s
ize.  This is in the 6 o'clock retroareolar location, and may represent a small fibroadenoma.  Papill
leanne is possible.  Biopsy is indicated with ultrasound guidance, post biopsy clip placement and follow
-up two-view mammogram.



IMPRESSION:  Small solid nodule in the left breast 6 o'clock retroareolar region.  Although this coul
d be a benign fibroadenoma, this might also represent a papilloma.  Ultrasound-guided core biopsy, po
st biopsy clip placement with follow-up two-view mammogram recommended.



BIRAD:  4 Suspicious. Biopsy should be considered.



RECOMMENDATION:  RECOMMENDED FOLLOW-UP: Ultrasound-guided core biopsy, post biopsy clip placement wit
h follow-up two-view mammogram recommended



COMMENT:  The American College of Radiology (ACR) has developed recommendations for screening MRI of 
the breasts in certain patient populations, to be used in conjunction with mammography.  Breast MRI s
urveillance may be appropriate for women with more than 20% lifetime risk of developing breast cancer
  as determined by genetic testing, significant family history of the disease, or history of mantle r
adiation for Hodgkins Disease.  ACR Practice Guidelines 2008.



TECHNICAL DOCUMENTATION:  JOB ID:  [id number]

 2011 [employer]- All Rights Reserved



Reading location - IP/workstation name: [email or web address]

## 2018-04-26 NOTE — WOMENS IMAGING REPORT
EXAM DESCRIPTION:  U/S BREAST BX; LEFT DIG DX MAMMO NO CHG



COMPLETED DATE/TIME:  4/26/2018 12:19 pm; 4/26/2018 12:01 pm



REASON FOR STUDY:  LEFT BREAST MASS; N63.20; LEFT BREAST POST BX CLIP PLACEMENT N63.20  UNSPECIFIED L
UMP IN THE LEFT BREAST, UNSPECIFIED QUAD



COMPARISON:  4/11/2018.



TECHNIQUE:  The procedure was discussed with the patient and the patient agreed to proceed.

The patient was scanned and the area of interest in the 6 o'clock retroareolar position of the left b
reast was localized.  This correlates with the area of concern on prior imaging studies. This area wa
s targeted for ultrasound-guided core biopsy.

After sterile skin prep and 5 mL local lidocaine 1% for skin and deep tissue anesthesia, a 14 gauge c
oaxial core biopsy needle was used to obtain several cores of tissue from the lesion.  Under ultrasou
nd guidance, a ribbon clip was placed in the areas sampled.  There were no immediate post-procedure c
omplications.

MAMMOGRAM: Post-procedure two view mammogram was acquired in the digital mammogram suite. The clip wa
s in the expected location. No significant hematoma.

Pathology is pending at the time of this report.



LIMITATIONS:  None.



FINDINGS:  Ultrasound guided breast biopsy as described above.

POST PROCEDURE MAMMOGRAMS FOR MARKER PLACEMENT: Yes



IMPRESSION:  ULTRASOUND-GUIDED CORE BIOPSY OF THE LEFT BREAST.  PATHOLOGY PENDING.



COMMENT:  Patient medication list reviewed: Yes- Quality ID# [id number]:Eligible professional attests to doc
umenting in the medical record they obtained, updated, or reviewed the patient's current medications.




TECHNICAL DOCUMENTATION:  JOB ID:  [id number]

 2011 [employer]- All Rights Reserved



Reading location - IP/workstation name: [facility]

## 2018-04-26 NOTE — WOMENS IMAGING REPORT
EXAM DESCRIPTION:  U/S BREAST BX; LEFT DIG DX MAMMO NO CHG



COMPLETED DATE/TIME:  4/26/2018 12:19 pm; 4/26/2018 12:01 pm



REASON FOR STUDY:  LEFT BREAST MASS; N63.20; LEFT BREAST POST BX CLIP PLACEMENT N63.20  UNSPECIFIED L
UMP IN THE LEFT BREAST, UNSPECIFIED QUAD



COMPARISON:  4/11/2018.



TECHNIQUE:  The procedure was discussed with the patient and the patient agreed to proceed.

The patient was scanned and the area of interest in the 6 o'clock retroareolar position of the left b
reast was localized.  This correlates with the area of concern on prior imaging studies. This area wa
s targeted for ultrasound-guided core biopsy.

After sterile skin prep and 5 mL local lidocaine 1% for skin and deep tissue anesthesia, a 14 gauge c
oaxial core biopsy needle was used to obtain several cores of tissue from the lesion.  Under ultrasou
nd guidance, a ribbon clip was placed in the areas sampled.  There were no immediate post-procedure c
omplications.

MAMMOGRAM: Post-procedure two view mammogram was acquired in the digital mammogram suite. The clip wa
s in the expected location. No significant hematoma.

Pathology is pending at the time of this report.



LIMITATIONS:  None.



FINDINGS:  Ultrasound guided breast biopsy as described above.

POST PROCEDURE MAMMOGRAMS FOR MARKER PLACEMENT: Yes



IMPRESSION:  ULTRASOUND-GUIDED CORE BIOPSY OF THE LEFT BREAST.  PATHOLOGY PENDING.



COMMENT:  Patient medication list reviewed: Yes- Quality ID# [id number]:Eligible professional attests to doc
umenting in the medical record they obtained, updated, or reviewed the patient's current medications.




TECHNICAL DOCUMENTATION:  JOB ID:  [id number]

 2011 [employer]- All Rights Reserved



Reading location - IP/workstation name: [facility]

## 2018-04-29 NOTE — ER DOCUMENT REPORT
ED GI/





- General


Chief Complaint: Flank Pain


Stated Complaint: RIGHT FLANK PAIN


Time Seen by Provider: 04/29/18 03:23


Mode of Arrival: Ambulatory


Information source: Patient


Notes: 





Patient is a 42-year-old female who presents to the emergency department with 

complaint of sudden onset right flank pain at 11 PM.  Patient states that she 

took 1200 mg of Motrin and try to get some sleep and was unsuccessful.  Patient 

states that the pain is in her right kidney area and radiates around and down 

to her right groin.  Patient denies any history of kidney stones.  Patient 

denies any nausea vomiting or diarrhea.  Patient reports that she was feeling 

perfectly fine before 11 PM.  Patient reports past medical history of asthma.  

Patient reports past surgical history of right ear surgery, right breast 

surgery and a partial hysterectomy.


TRAVEL OUTSIDE OF THE [place] IN LAST 30 DAYS: No





- Related Data


Allergies/Adverse Reactions: 


 





Sulfa (Sulfonamide Antibiotics) Allergy (Verified 02/22/18 08:48)


 











Past Medical History





- General


Information source: Patient





- Social History


Smoking Status: Current Some Day Smoker


Family History: Reviewed & Not Pertinent





- Past Medical History


Cardiac Medical History: 


   Denies: Hx Coronary Artery Disease, Hx Heart Attack, Hx Hypertension


Pulmonary Medical History: Reports: Hx Asthma - Hospitalized March & April 2015

, Hx Pneumonia - 3-4 yrs ago


   Denies: Hx Bronchitis, Hx COPD, Hx Tuberculosis


Neurological Medical History: Denies: Hx Cerebrovascular Accident, Hx Seizures


Renal/ Medical History: Denies: Hx Peritoneal Dialysis


Musculoskeltal Medical History: Denies Hx Arthritis


Past Surgical History: Reports: Hx Breast Surgery - left breast lump removed, 

Hx Genitourinary Surgery - Bladder tuck, Hx Hysterectomy, Hx Orthopedic Surgery 

- RIGHT ANKLE ORIF, Hx Tubal Ligation.  Denies: Hx Pacemaker





- Immunizations


Hx Diphtheria, Pertussis, Tetanus Vaccination: Yes


Hx Pneumococcal Vaccination: 03/30/15





Review of Systems





- Review of Systems


Constitutional: No symptoms reported


EENT: No symptoms reported


Cardiovascular: No symptoms reported


Respiratory: No symptoms reported


Gastrointestinal: No symptoms reported


Genitourinary: Flank pain - Right-sided flank pain sudden onset 11 PM


Female Genitourinary: No symptoms reported


Musculoskeletal: No symptoms reported


Skin: No symptoms reported


Hematologic/Lymphatic: No symptoms reported


Neurological/Psychological: No symptoms reported





Physical Exam





- Vital signs


Vitals: 


 











Temp Pulse Resp BP Pulse Ox


 


 98.0 F   80   20   134/77 H  95 


 


 04/29/18 01:56  04/29/18 01:56  04/29/18 01:56  04/29/18 01:56  04/29/18 01:56














- Notes


Notes: 





PHYSICAL EXAMINATION:





GENERAL: Well-appearing, well-nourished and in no acute distress.





HEAD: Atraumatic, normocephalic.





EYES: Pupils equal round and reactive to light, extraocular movements intact, 

conjunctiva are normal.





ENT: Nares patent, oropharynx clear without exudates.  Moist mucous membranes.





NECK: Normal range of motion, supple without lymphadenopathy





LUNGS: Breath sounds clear to auscultation bilaterally and equal.  No wheezes 

rales or rhonchi.





HEART: Regular rate and rhythm without murmurs





ABDOMEN: Soft, nontender, nondistended abdomen.  No guarding, no rebound.  No 

masses appreciated.





Female : Pelvis non-tender, right flank pain with palpation.  





Musculoskeletal: Normal range of motion, no pitting or edema.  No cyanosis.





NEUROLOGICAL: Cranial nerves grossly intact.  Normal speech, normal gait.  

Normal sensory, motor exams





PSYCH: Normal mood, normal affect.





SKIN: Warm, Dry, normal turgor, no rashes or lesions noted.





Course





- Re-evaluation


Re-evalutation: 





04/29/18 03:31


Nontoxic patient with acute onset of right flank pain.  Patient's exam is 

concerning for renal stone.  Patient has no history of stones and has not had 

any recent CT scans, will order CT limited to evaluate for renal stone and will 

provide patient with pain medication.  There is 3+ bacteria in the urine 

however there are no nitrites and no leukocytes.  Patient denies any fever or 

any urinary frequency, urgency or dysuria.  WBC elevated likely due to acute 

pain.  


04/29/18 06:31


Patient has a 0.3 cm stone to the right distal ureter.  Will discharge patient 

home with pain medication and urology follow-up to as kidney function is 

normal.  Patient understands she is to return to the emergency department 

immediately if she develops fever or worsening pain.  Patient agrees to call 

urology Monday morning for an appointment.








- Vital Signs


Vital signs: 


 











Temp Pulse Resp BP Pulse Ox


 


 98.0 F   80   20   134/77 H  95 


 


 04/29/18 01:56  04/29/18 01:56  04/29/18 01:56  04/29/18 01:56  04/29/18 01:56














- Laboratory


Result Diagrams: 


 04/29/18 06:13





 04/29/18 06:13


Laboratory results interpreted by me: 


 











  04/29/18 04/29/18





  01:45 06:13


 


WBC   18.7 H


 


Hgb   11.9 L


 


RDW   14.6 H


 


Absolute Neutrophils   14.3 H


 


Urine Protein  100 H 


 


Urine Blood  LARGE H 


 


Urine Ascorbic Acid  40 H 














Discharge





- Discharge


Clinical Impression: 


 Renal stone, Flank pain





Condition: Stable


Disposition: HOME, SELF-CARE


Instructions:  Antinausea Medication ([facility]), Oral Narcotic Medication ([facility]), 

Pain Medication Injection ([facility]), Toradol Injection ([facility])


Additional Instructions: 





Kidney Stone





     You are passing or have passed a kidney stone.  These stones are usually 

due to increased calcium or uric acid concentrations in your urine.  Stones 

within the kidney itself are not painful.  The pain occurs as the stone leaves 

the kidney to pass down the long tube, called the ureter, leading to the 

bladder.


     If the stone is small, it will usually pass by itself.  Most patients can 

pass the stone at home.  You will usually receive medications for pain, nausea 

or vomiting, and sometimes a medication to assist in passing the kidney stone.  

However, if the pain is very severe or if vomiting prevents you from taking 

oral pain medications, you may need to return for further treatment.


     Drink three or four quarts of fluids per day.  You will be given pain 

medication (if needed) and urine strainers.  Strain all your urine to see if 

the stone passes.  If your doctor has asked you to bring the stone in for 

analysis, return with the stone once it has passed.


     


Return if pain or vomiting become severe, if you develop a high fever, if you 

are unable to pass your urine, or if other unusual symptoms occur





Please follow-up with urology.  Call Monday and let them know that you were 

diagnosed with a 0.3 cm stone with an elevated wbc.  You are being prescribed 

an antibiotic due to your elevated white blood count and bacteria in your 

urine.  It is very important if you develop fever the return to the emergency 

department immediately.





[facility]


[address] [place] [id number] [phone]





Prescriptions: 


Cephalexin Monohydrate [Keflex 500 mg Capsule] 500 mg PO Q6H 5 Days #20 capsule


Morphine Sulfate [Morphine Ir 15 Mg Tablet] 15 mg PO Q4H PRN #16 tablet


 PRN Reason: 


Ondansetron [Zofran Odt 4 mg Tablet] 1 - 2 tab PO Q4H PRN #20 tab.rapdis


 PRN Reason: For Nausea/Vomiting


Referrals: 


OFE ACEVEDO MD [Primary Care Provider] - Follow up as needed

## 2018-04-29 NOTE — RADIOLOGY REPORT (SQ)
EXAM DESCRIPTION:

CT LTD RENAL STONE PROTOCOL ON



CLINICAL HISTORY:

42 years Female, right flank pain evaluate for renal stone



COMPARISON:

None.



TECHNIQUE:

No contrast.  Coronal and sagittal reformat.  This exam was

performed according to our departmental dose-optimization

program, which includes automated exposure control, adjustment of

the mA and/or kV according to patient size and/or use of

iterative reconstruction technique.



FINDINGS:

0.3 cm likely right distal ureteral stone within 2 cm of the

ureterovesicular junction, minimal right hydronephrosis, no

hydroureter and small right perinephric fat stranding. Punctate

bilateral nephrolithiasis.



Normal appendix. Unenhanced lower thorax, abdominopelvic

structures, and musculoskeleton appear otherwise grossly

unremarkable.



Impression: 0.3 cm right distal ureteral stone with low-grade

obstruction.